# Patient Record
Sex: MALE | Race: OTHER | NOT HISPANIC OR LATINO | URBAN - METROPOLITAN AREA
[De-identification: names, ages, dates, MRNs, and addresses within clinical notes are randomized per-mention and may not be internally consistent; named-entity substitution may affect disease eponyms.]

---

## 2019-07-19 PROBLEM — Z00.00 ENCOUNTER FOR PREVENTIVE HEALTH EXAMINATION: Status: ACTIVE | Noted: 2019-07-19

## 2019-08-05 ENCOUNTER — EMERGENCY (EMERGENCY)
Facility: HOSPITAL | Age: 54
LOS: 1 days | Discharge: HOME | End: 2019-08-05
Attending: EMERGENCY MEDICINE | Admitting: EMERGENCY MEDICINE
Payer: MEDICAID

## 2019-08-05 VITALS
SYSTOLIC BLOOD PRESSURE: 162 MMHG | TEMPERATURE: 98 F | DIASTOLIC BLOOD PRESSURE: 96 MMHG | HEART RATE: 84 BPM | RESPIRATION RATE: 16 BRPM | OXYGEN SATURATION: 100 %

## 2019-08-05 VITALS
RESPIRATION RATE: 18 BRPM | HEART RATE: 94 BPM | SYSTOLIC BLOOD PRESSURE: 170 MMHG | DIASTOLIC BLOOD PRESSURE: 89 MMHG | TEMPERATURE: 98 F | OXYGEN SATURATION: 99 %

## 2019-08-05 LAB
ALBUMIN SERPL ELPH-MCNC: 4.9 G/DL — SIGNIFICANT CHANGE UP (ref 3.3–5)
ALP SERPL-CCNC: 75 U/L — SIGNIFICANT CHANGE UP (ref 40–120)
ALT FLD-CCNC: 24 U/L — SIGNIFICANT CHANGE UP (ref 4–41)
ANION GAP SERPL CALC-SCNC: 12 MMO/L — SIGNIFICANT CHANGE UP (ref 7–14)
AST SERPL-CCNC: 19 U/L — SIGNIFICANT CHANGE UP (ref 4–40)
BASOPHILS # BLD AUTO: 0.04 K/UL — SIGNIFICANT CHANGE UP (ref 0–0.2)
BASOPHILS NFR BLD AUTO: 0.5 % — SIGNIFICANT CHANGE UP (ref 0–2)
BILIRUB SERPL-MCNC: 0.8 MG/DL — SIGNIFICANT CHANGE UP (ref 0.2–1.2)
BUN SERPL-MCNC: 10 MG/DL — SIGNIFICANT CHANGE UP (ref 7–23)
CALCIUM SERPL-MCNC: 10.5 MG/DL — SIGNIFICANT CHANGE UP (ref 8.4–10.5)
CHLORIDE SERPL-SCNC: 100 MMOL/L — SIGNIFICANT CHANGE UP (ref 98–107)
CO2 SERPL-SCNC: 28 MMOL/L — SIGNIFICANT CHANGE UP (ref 22–31)
CREAT SERPL-MCNC: 0.72 MG/DL — SIGNIFICANT CHANGE UP (ref 0.5–1.3)
EOSINOPHIL # BLD AUTO: 0.37 K/UL — SIGNIFICANT CHANGE UP (ref 0–0.5)
EOSINOPHIL NFR BLD AUTO: 4.9 % — SIGNIFICANT CHANGE UP (ref 0–6)
GLUCOSE SERPL-MCNC: 195 MG/DL — HIGH (ref 70–99)
HCT VFR BLD CALC: 39 % — SIGNIFICANT CHANGE UP (ref 39–50)
HGB BLD-MCNC: 12.9 G/DL — LOW (ref 13–17)
IMM GRANULOCYTES NFR BLD AUTO: 0.3 % — SIGNIFICANT CHANGE UP (ref 0–1.5)
LYMPHOCYTES # BLD AUTO: 1.91 K/UL — SIGNIFICANT CHANGE UP (ref 1–3.3)
LYMPHOCYTES # BLD AUTO: 25.4 % — SIGNIFICANT CHANGE UP (ref 13–44)
MCHC RBC-ENTMCNC: 29.5 PG — SIGNIFICANT CHANGE UP (ref 27–34)
MCHC RBC-ENTMCNC: 33.1 % — SIGNIFICANT CHANGE UP (ref 32–36)
MCV RBC AUTO: 89 FL — SIGNIFICANT CHANGE UP (ref 80–100)
MONOCYTES # BLD AUTO: 0.54 K/UL — SIGNIFICANT CHANGE UP (ref 0–0.9)
MONOCYTES NFR BLD AUTO: 7.2 % — SIGNIFICANT CHANGE UP (ref 2–14)
NEUTROPHILS # BLD AUTO: 4.64 K/UL — SIGNIFICANT CHANGE UP (ref 1.8–7.4)
NEUTROPHILS NFR BLD AUTO: 61.7 % — SIGNIFICANT CHANGE UP (ref 43–77)
NRBC # FLD: 0 K/UL — SIGNIFICANT CHANGE UP (ref 0–0)
PLATELET # BLD AUTO: 226 K/UL — SIGNIFICANT CHANGE UP (ref 150–400)
PMV BLD: 10.3 FL — SIGNIFICANT CHANGE UP (ref 7–13)
POTASSIUM SERPL-MCNC: 4.6 MMOL/L — SIGNIFICANT CHANGE UP (ref 3.5–5.3)
POTASSIUM SERPL-SCNC: 4.6 MMOL/L — SIGNIFICANT CHANGE UP (ref 3.5–5.3)
PROT SERPL-MCNC: 8.4 G/DL — HIGH (ref 6–8.3)
RBC # BLD: 4.38 M/UL — SIGNIFICANT CHANGE UP (ref 4.2–5.8)
RBC # FLD: 12.4 % — SIGNIFICANT CHANGE UP (ref 10.3–14.5)
SODIUM SERPL-SCNC: 140 MMOL/L — SIGNIFICANT CHANGE UP (ref 135–145)
TROPONIN T, HIGH SENSITIVITY: < 6 NG/L — SIGNIFICANT CHANGE UP (ref ?–14)
WBC # BLD: 7.52 K/UL — SIGNIFICANT CHANGE UP (ref 3.8–10.5)
WBC # FLD AUTO: 7.52 K/UL — SIGNIFICANT CHANGE UP (ref 3.8–10.5)

## 2019-08-05 PROCEDURE — 99284 EMERGENCY DEPT VISIT MOD MDM: CPT

## 2019-08-05 PROCEDURE — 71045 X-RAY EXAM CHEST 1 VIEW: CPT | Mod: 26

## 2019-08-05 NOTE — ED ADULT NURSE NOTE - NSIMPLEMENTINTERV_GEN_ALL_ED
Implemented All Universal Safety Interventions:  [place] to call system. Call bell, personal items and telephone within reach. Instruct patient to call for assistance. Room bathroom lighting operational. Non-slip footwear when patient is off stretcher. Physically safe environment: no spills, clutter or unnecessary equipment. Stretcher in lowest position, wheels locked, appropriate side rails in place.

## 2019-08-05 NOTE — ED ADULT NURSE NOTE - OBJECTIVE STATEMENT
52y/o male aaox4 and ambulatory c/o dizziness. Pt states earlier today at work while ambulating he started to feel dizzy; states co-workers helped him to a seated position. Pt states after resting for a while he started to feel better. Pt denies chest pain, SOB, change in LOC, HA, vision changes, diaphoresis, palpitations, abdominal pain, N/V/D, room spinning. Respirations even and unlabored. 20g in LAC; labs collected and sent as per MD order. PMH of HTN, diabetes, and hyperlipidemia. Pt NSR on cardiac monitor. Pt family at bedside. Call light within reach. Will continue to monitor.

## 2019-08-05 NOTE — ED ADULT TRIAGE NOTE - CHIEF COMPLAINT QUOTE
pt comes to ED for CP that started today and dizziness. pt has hx of htn and dm. pt VSS NAD ekg to be obtained

## 2019-08-06 NOTE — ED PROVIDER NOTE - PHYSICAL EXAMINATION
Gen: WDWN, NAD  HEENT: EOMI, no nasal discharge, mucous membranes moist  CV: RRR, +S1/S2, no M/R/G  Resp: CTAB, no W/R/R  GI: Abdomen soft non-distended, NTTP, no masses  MSK: No open wounds, no bruising, no LE edema  Neuro: A&Ox4, following commands, moving all four extremities spontaneously, CN 1-12 intact  Psych: appropriate mood, denies AH, VH, SI

## 2019-08-06 NOTE — ED PROVIDER NOTE - NS ED ROS FT
Gen: Denies fever, weight loss  CV: Denies chest pain, palpitations  Skin: Denies rash, erythema, color changes  Resp: Denies SOB, cough  Endo: Denies sensitivity to heat, cold, increased urination  GI: Denies constipation, nausea, vomiting  Msk: Denies back pain, LE swelling, extremity pain  : Denies dysuria, increased frequency  Neuro: Denies LOC, weakness, seizures  Psych: Denies hx of psych, hallucinations

## 2019-08-06 NOTE — ED PROVIDER NOTE - NSFOLLOWUPINSTRUCTIONS_ED_ALL_ED_FT
You were seen and evaluated today. Your studies and lab results all returned within normal limits. Please follow-up with your Primary Care Physician regarding your high blood glucose levels and hypertension.       When should I seek immediate care?     You have severe pain in your back, arms, or legs that worsens.  You have sudden or worsened muscle weakness or loss of movement.  You are not able to control when you urinate or have a bowel movement.      WHAT YOU NEED TO KNOW:    What is weakness? Weakness is a loss of muscle strength. You may have weakness in a single muscle or in a group of muscles. Weakness can come and go or be constant. Weakness can get worse over time. You may have weakness for a short time, or it may be permanent.    What causes or increases my risk for weakness?     Older age      A problem in your brain, nerves, or muscles      A condition such as dehydration, a heart problem, infection, or pregnancy      Anxiety or depression      Steroid or heart medicine, or muscle relaxers      Alcohol or illegal drugs      Lack of movement, such as from wearing a cast or splint, or being on bed rest    How is the cause of weakness diagnosed? Your healthcare provider will ask when your signs and symptoms started and what makes your weakness worse. Tell your provider about any medical conditions you have. He or she will test your muscle strength, reflexes, and sense of touch. He or she will also check how far you can move or lift your weakened area. You may also need any of the following:    Blood tests may be used to check for infection or another condition that can cause weakness.      A muscle biopsy is a procedure used to take a muscle sample. This may happen if your blood tests do not show the cause of your weakness.      X-ray pictures may show what is causing your weakness.    How can I manage weakness?     Use assistive devices as directed. These help protect you from injury. Examples include a walker or cane. Have someone install handrails in your home. These will help you get out of a bathtub or stand up from a toilet. Use a shower chair so you can sit while you shower. Sit down on the toilet or another chair to dry off and put on your clothes. Get help going up and down stairs if your legs are weak.       Go to physical or occupational therapy if directed. A physical therapist can teach you exercises to help strengthen weak muscles. An occupational therapist can show you ways to do your daily activities more easily. For example, light forks and spoons can be easier to use if you have hand weakness. You may also learn ways to organize your household items so you are not moving heavy items.      Balance rest with exercise. Exercise can help increase your muscle strength and energy. Do not exercise for long periods at a time. Take breaks often to rest. Too much exercise can cause muscle strain or make you more tired. Ask your healthcare provider how much exercise is right for you.      Eat a variety of healthy foods. Too much or too little food may cause weakness or tiredness. Ask your healthcare provider what a healthy amount of food is for you. Healthy foods include fruits, vegetables, whole-grain breads, low-fat dairy products, lean meats and fish, nuts, and cooked beans.      Do not smoke. Nicotine and other chemicals in cigarettes and cigars can make your symptoms worse, and can cause lung damage. Ask your healthcare provider for information if you currently smoke and need help to quit. E-cigarettes or smokeless tobacco still contain nicotine. Talk to your healthcare provider before you use these products.       Do not use caffeine, alcohol, or illegal drugs. These may cause muscle twitching, which could lead to worsened weakness.     Call 911 for any of the following:     You have any of the following signs of a stroke:   Numbness or drooping on one side of your face     Weakness in an arm or leg  Confusion or difficulty speaking  Dizziness, a severe headache, or vision loss  You lose feeling in your weakened body area.  You have electric shock-like feelings down your arms and legs when you flex or move your neck.  You have sudden or increased trouble speaking, swallowing, or breathing.

## 2019-08-06 NOTE — ED PROVIDER NOTE - OBJECTIVE STATEMENT
Pt is 53M w/ PMH HTN DM here for weakness, now resolved. Pt reports at 1030 am he was working at [email or web address] job and felt suddenly weak lasting <10 minutes, sat down and rested for several minutes before going back to work. At this time is asymptomatic, came to ED to alleviate his concerns. Did not suffer LOC, no diaphoresis, no CP, no Abd pain, no dyspnea. No dizziness, no changes in vision, no muscle weakness. Reports prior episodes "working in [place]".

## 2019-08-06 NOTE — ED PROVIDER NOTE - ATTENDING CONTRIBUTION TO CARE
I performed a face to face bedside interview with patient regarding history of present illness, review of symptoms and past medical history. I completed an independent physical exam.  I have discussed patient's plan of care.   I agree with note as stated above, having amended the EMR as needed to reflect my findings. I have discussed the assessment and plan of care.  This includes during the time I functioned as the attending physician for this patient.  Attending Contribution to Care: agree with plan of resident. PMH HTN DM here for weakness, now resolved. Pt reports at 1030 am he was working at [employer] and felt suddenly weak lasting <10 minutes, sat down and rested for several minutes before going back to work. At this time is asymptomatic, came to ED to alleviate his concerns. Did not suffer LOC, no diaphoresis, no CP, no Abd pain, no dyspnea. No dizziness, labs wnl with no abnormalities.

## 2019-08-06 NOTE — ED PROVIDER NOTE - CLINICAL SUMMARY MEDICAL DECISION MAKING FREE TEXT BOX
Pt is 53M w/ pmh diabetes and HTN here for episode of weakness. EKG unremarkable tropes <6 patient says symptoms resolved after 5 min unlikely to be ACS pt will go home f/u w/ PCP

## 2019-08-12 ENCOUNTER — APPOINTMENT (OUTPATIENT)
Dept: VASCULAR SURGERY | Facility: CLINIC | Age: 54
End: 2019-08-12
Payer: MEDICAID

## 2019-08-12 ENCOUNTER — CLINICAL ADVICE (OUTPATIENT)
Age: 54
End: 2019-08-12

## 2019-08-12 VITALS
BODY MASS INDEX: 25.11 KG/M2 | DIASTOLIC BLOOD PRESSURE: 75 MMHG | HEIGHT: 67 IN | WEIGHT: 160 LBS | HEART RATE: 68 BPM | TEMPERATURE: 97.1 F | SYSTOLIC BLOOD PRESSURE: 143 MMHG

## 2019-08-12 DIAGNOSIS — I83.90 ASYMPTOMATIC VARICOSE VEINS OF UNSPECIFIED LOWER EXTREMITY: ICD-10-CM

## 2019-08-12 PROBLEM — I10 ESSENTIAL (PRIMARY) HYPERTENSION: Chronic | Status: ACTIVE | Noted: 2019-08-06

## 2019-08-12 PROBLEM — E11.9 TYPE 2 DIABETES MELLITUS WITHOUT COMPLICATIONS: Chronic | Status: ACTIVE | Noted: 2019-08-06

## 2019-08-12 PROCEDURE — 99204 OFFICE O/P NEW MOD 45 MIN: CPT

## 2019-08-12 PROCEDURE — 93970 EXTREMITY STUDY: CPT

## 2019-08-12 NOTE — PHYSICAL EXAM
[Varicose Veins Of Lower Extremities] : present [Varicose Veins Of The Left Leg] : of the left leg [] : bilaterally [Ankle Swelling Bilaterally] : severe [No Rash or Lesion] : No rash or lesion [Alert] : alert [Calm] : calm [JVD] : no jugular venous distention  [Normal Breath Sounds] : Normal breath sounds [Normal Heart Sounds] : normal heart sounds [2+] : right 2+ [Ankle Swelling (On Exam)] : not present [Abdomen Masses] : No abdominal masses [Skin Ulcer] : no ulcer [Oriented to Place] : oriented to place [de-identified] : appears well  [de-identified] : mild left calf tenderness\par no palpable cords

## 2019-08-12 NOTE — CONSULT LETTER
[Dear  ___] : Dear  [unfilled], [Consult Letter:] : I had the pleasure of evaluating your patient, [unfilled]. [Consult Closing:] : Thank you very much for allowing me to participate in the care of this patient.  If you have any questions, please do not hesitate to contact me. [Please see my note below.] : Please see my note below. [Sincerely,] : Sincerely, [FreeTextEntry3] : Cade Messer M.D., F.KEARA.S., R.P.V.I.\par  of Vascular Surgery\par Chief, Vascular Surgery at [facility]\par Chief, Endovascular Surgery at [facility]\par Medical Director of Endovascular Program\par [facility]\par

## 2019-08-12 NOTE — HISTORY OF PRESENT ILLNESS
[FreeTextEntry1] : 54 yo male with history of htn and varicose veins s/p vein surgery 7 years ago in [place] presents today for painful varicose veins.  pt states that he has had the veins for a while but has recently started a job that requires a great deal of standing and now the veins have become painful.

## 2019-08-12 NOTE — ASSESSMENT
[FreeTextEntry1] : 52 yo male with history of htn and varicose veins s/p vein surgery 7 years ago in [place] presents today for painful varicose veins.\par venous duplex shows insufficency in the left gsv and pop with varicosities in the calf\par given the skin discoloration and thinning over the medial malleolus would recommend ablation of the left gsv and stab phlebectomy to avoid any further skin breakdown

## 2019-09-20 ENCOUNTER — APPOINTMENT (OUTPATIENT)
Dept: ENDOVASCULAR SURGERY | Facility: CLINIC | Age: 54
End: 2019-09-20

## 2019-09-24 ENCOUNTER — APPOINTMENT (OUTPATIENT)
Dept: VASCULAR SURGERY | Facility: CLINIC | Age: 54
End: 2019-09-24
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
## 2022-01-11 ENCOUNTER — TRANSCRIPTION ENCOUNTER (OUTPATIENT)
Age: 57
End: 2022-01-11